# Patient Record
Sex: FEMALE | Race: WHITE | Employment: UNEMPLOYED | URBAN - METROPOLITAN AREA
[De-identification: names, ages, dates, MRNs, and addresses within clinical notes are randomized per-mention and may not be internally consistent; named-entity substitution may affect disease eponyms.]

---

## 2017-05-22 ENCOUNTER — OFFICE VISIT (OUTPATIENT)
Dept: DERMATOLOGY | Facility: CLINIC | Age: 22
End: 2017-05-22
Payer: COMMERCIAL

## 2017-05-22 VITALS — DIASTOLIC BLOOD PRESSURE: 77 MMHG | OXYGEN SATURATION: 99 % | SYSTOLIC BLOOD PRESSURE: 116 MMHG | HEART RATE: 68 BPM

## 2017-05-22 DIAGNOSIS — L73.8 SEBACEOUS GLAND HYPERPLASIA: Primary | ICD-10-CM

## 2017-05-22 PROCEDURE — 99203 OFFICE O/P NEW LOW 30 MIN: CPT | Performed by: PHYSICIAN ASSISTANT

## 2017-05-22 NOTE — NURSING NOTE
"Chief Complaint   Patient presents with     Derm Problem     spot to check       Initial /77  Pulse 68  SpO2 99% Estimated body mass index is 25.77 kg/(m^2) as calculated from the following:    Height as of 1/25/16: 1.588 m (5' 2.5\").    Weight as of 1/25/16: 65 kg (143 lb 3.2 oz).  BP completed using cuff size: kayden Moore LPN    "

## 2017-05-22 NOTE — MR AVS SNAPSHOT
"              After Visit Summary   2017    Kelsy Ramírez    MRN: [id number]           Patient Information     Date Of Birth          1995        Visit Information        Provider Department      2017 3:45 PM Tamra Ramires PA-C [facility]        Today's Diagnoses     Sebaceous gland hyperplasia    -  1       Follow-ups after your visit        Who to contact     If you have questions or need follow up information about today's clinic visit or your schedule please contact [facility] directly at [phone].  Normal or non-critical lab and imaging results will be communicated to you by HomeRunhart, letter or phone within 4 business days after the clinic has received the results. If you do not hear from us within 7 days, please contact the clinic through HomeRunhart or phone. If you have a critical or abnormal lab result, we will notify you by phone as soon as possible.  Submit refill requests through [facility] or call your pharmacy and they will forward the refill request to us. Please allow 3 business days for your refill to be completed.          Additional Information About Your Visit        MyChart Information     [facility] lets you send messages to your doctor, view your test results, renew your prescriptions, schedule appointments and more. To sign up, go to [email or web address] . Click on \"Log in\" on the left side of the screen, which will take you to the Welcome page. Then click on \"Sign up Now\" on the right side of the page.     You will be asked to enter the access code listed below, as well as some personal information. Please follow the directions to create your username and password.     Your access code is: [email or web address]  Expires: 2017  4:33 PM     Your access code will  in 90 days. If you need help or a new code, please call your [facility] or [phone].        Care EveryWhere ID     This is your Care EveryWhere ID. This could be used by " other organizations to access your [facility] medical records  [id number]        Your Vitals Were     Pulse Pulse Oximetry                68 99%           Blood Pressure from Last 3 Encounters:   05/22/17 116/77   01/25/16 119/78   10/30/14 114/68    Weight from Last 3 Encounters:   01/25/16 65 kg (143 lb 3.2 oz) (78 %)*   10/30/14 61.1 kg (134 lb 9.6 oz) (71 %)*   09/12/14 64.4 kg (142 lb) (80 %)*     * Growth percentiles are based on [facility] 2-20 Years data.              Today, you had the following     No orders found for display       Primary Care Provider Office Phone # Fax #    Mynor Temple [phone]       [address] [address]        Thank you!     Thank you for choosing [facility]  for your care. Our goal is always to provide you with excellent care. Hearing back from our patients is one way we can continue to improve our services. Please take a few minutes to complete the written survey that you may receive in the mail after your visit with us. Thank you!             Your Updated Medication List - Protect others around you: Learn how to safely use, store and throw away your medicines at www.disposemymeds.org.          This list is accurate as of: 5/22/17  4:33 PM.  Always use your most recent med list.                   Brand Name Dispense Instructions for use    CORDRAN 4 MCG/SQCM Tape   Generic drug:  flurandrenolide          IMPLANON SC

## 2017-05-22 NOTE — PROGRESS NOTES
HPI:   Kelsy Ramírez is a 21 year old female who presents for evaluation of a spot on the forehead  chief complaint  Location: mid forehead   Condition present for:  months.   Previous treatments include: none  -Recently stopped Accutane 1 month ago. Was being treated in [place] where she was going to school  -Going to a new school in [place], [place] next year. Studying bio.     Review Of Systems  Eyes: negative  Ears/Nose/Throat: negative  Respiratory: No shortness of breath, dyspnea on exertion, cough, or hemoptysis  Cardiovascular: negative  Gastrointestinal: negative  Genitourinary: negative  Musculoskeletal: negative  Neurologic: negative  Psychiatric: negative        PHYSICAL EXAM:      Skin exam performed as follows: Type 2 skin. Mood appropriate  Alert and Oriented X 3. Well developed, well nourished in no distress.  General appearance: Normal  Head including face: Normal  Eyes: conjunctiva and lids: Normal  Mouth: Lips, teeth, gums: Normal  Neck: Normal  Chest-breast/axillae: Normal  Back: Normal  Spleen and liver: Normal  Cardiovascular: Exam of peripheral vascular system by observation for swelling, varicosities, edema: Normal  Genitalia: groin, buttocks: Normal  Extremities: digits/nails (clubbing): Normal  Eccrine and Apocrine glands: Normal  Right upper extremity: Normal  Left upper extremity: Normal  Right lower extremity: Normal  Left lower extremity: Normal  Skin: Scalp and body hair: See below    1. Waxy yellow papule on mid forehead x 1    ASSESSMENT/PLAN:     1. Sebaceous hyperplasia located on mid forehead. Advised benign. Discussed cautery if desired; advised on approximate cost if not covered. She would like to proceed with cautery. Stopped Accutane 1 month ago. Recommend waiting a full 6 months after stopping Accutane to do any sort of procedure on the skin.         Follow-up: October/November for cautery  CC:   Scribed By: Tamra Ramires, MS, PAShiraC

## 2019-07-16 ENCOUNTER — TELEPHONE (OUTPATIENT)
Dept: DERMATOLOGY | Facility: CLINIC | Age: 24
End: 2019-07-16

## 2019-07-16 NOTE — TELEPHONE ENCOUNTER
"Tried to reach caller, but mailbox is full and cannot accept new messages.     RN cannot \"See\" a patient. Needs to be seen and examined/diagnosed by a Health care Provider.    No openings in Dermatology clinic until Mid September and > 130 patients currently on Dermatology clinic wait list.     Can try PCP, or [facility] [phone] or [facility] [phone].    Unable to work in those dates as Clinic is not open weekends and only Derm Provider is already full on Friday 7-19-19.     Lisette Juares RN        "

## 2019-07-16 NOTE — TELEPHONE ENCOUNTER
"Reason for Call:  Other     Detailed comments: Pt's mom, Belinda, calling (no consent to communicate on file):  Pt has a mole that she wants looked at. It is located on her back and states it looks \"weird\". Pt is coming to town on 07/19 and leaving on 07/21. AFR said she could possibly be seen by an RN - Please advise    Phone Number Patient can be reached at: Mom's cell:  [phone]    Pt's cell:  [phone]    Best Time: Any    Can we leave a detailed message on this number? YES    Call taken on 7/16/2019 at 3:35 PM by Denise Behrendt      "

## 2019-10-04 ENCOUNTER — TELEPHONE (OUTPATIENT)
Dept: DERMATOLOGY | Facility: CLINIC | Age: 24
End: 2019-10-04

## 2019-10-04 NOTE — TELEPHONE ENCOUNTER
Mom scheduled Dec 26 th appt for patient to have Sebaceous Hyperplasia cauterized by Provider.     Do you want to see patient for this? Or is she to see Dr. Rangel?...    If you see her, do you need extra time?.. (Appt was scheduled for 30 minutes).    Please advise. (No consent to communicate with Mom is in chart, so will need to ask Mom to ask patient to call us)     Lisette Juares RN

## 2019-10-05 NOTE — TELEPHONE ENCOUNTER
I can see her; no need for extra time, just a 15 min slot     Please offer her BLT cream as it hurts to cauterize. If she wants this for anesthesia, please have her arrive 20-30 min prior to appointment and have her let the front know she has arrived for numbing cream prior to her procedure so we know to get it on her.

## 2019-10-07 NOTE — TELEPHONE ENCOUNTER
Called patient and informed of note below. Pt verbalized understanding and will come early for BLT cream.   Marian SANTIAGO RN BSN PHN  Specialty Clinics

## 2019-12-26 ENCOUNTER — OFFICE VISIT (OUTPATIENT)
Dept: DERMATOLOGY | Facility: CLINIC | Age: 24
End: 2019-12-26
Payer: COMMERCIAL

## 2019-12-26 VITALS — HEART RATE: 80 BPM | DIASTOLIC BLOOD PRESSURE: 71 MMHG | SYSTOLIC BLOOD PRESSURE: 110 MMHG | OXYGEN SATURATION: 98 %

## 2019-12-26 DIAGNOSIS — L73.8 SEBACEOUS HYPERPLASIA: Primary | ICD-10-CM

## 2019-12-26 DIAGNOSIS — D23.9 DERMAL NEVUS: ICD-10-CM

## 2019-12-26 PROCEDURE — 96999 UNLISTED SPEC DERM SVC/PX: CPT | Performed by: PHYSICIAN ASSISTANT

## 2019-12-26 PROCEDURE — 99213 OFFICE O/P EST LOW 20 MIN: CPT | Performed by: PHYSICIAN ASSISTANT

## 2019-12-26 NOTE — LETTER
12/26/2019         RE: Kelsy Ramírez  [address] [address]        Dear Colleague,    Thank you for referring your patient, Kelsy Ramírez, to the [facility]. Please see a copy of my visit note below.    HPI:   CC: Kelsy Ramírez is a 21 year old female who presents for treatment of sebaceous gland hyperplasia on the forehead  Location: mid forehead   Condition present for:  months.   Previous treatments include: none  -Going to a new school in [place], [place] next year. Maybe will move to [place] after graduation    Review Of Systems  Eyes: negative  Ears/Nose/Throat: negative  Respiratory: No shortness of breath, dyspnea on exertion, cough, or hemoptysis  Cardiovascular: negative  Gastrointestinal: negative  Genitourinary: negative  Musculoskeletal: negative  Neurologic: negative  Psychiatric: negative        PHYSICAL EXAM:    /71 (BP Location: Left arm, Patient Position: Sitting, Cuff Size: Adult Regular)   Pulse 80   SpO2 98%   Skin exam performed as follows: Type 2 skin. Mood appropriate  Alert and Oriented X 3. Well developed, well nourished in no distress.  General appearance: Normal  Head including face: Normal  Eyes: conjunctiva and lids: Normal  Mouth: Lips, teeth, gums: Normal  Neck: Normal  Chest-breast/axillae: Normal  Back: Normal  Spleen and liver: Normal  Cardiovascular: Exam of peripheral vascular system by observation for swelling, varicosities, edema: Normal  Genitalia: groin, buttocks: Normal  Extremities: digits/nails (clubbing): Normal  Eccrine and Apocrine glands: Normal  Right upper extremity: Normal  Left upper extremity: Normal  Right lower extremity: Normal  Left lower extremity: Normal  Skin: Scalp and body hair: See below    1. Waxy yellow papule on mid forehead x 1  2. Brown fleshy papules on the back    ASSESSMENT/PLAN:     1. Sebaceous hyperplasia located on mid forehead. Cautery performed at a setting of 4.0. Pt  tolerated well with no complications.   2. . Dermal nevi on the back - benign in appearance no treatment needed        Follow-up: PRN  CC:   Scribed By: Tamra Ramires, MS, DAY      Again, thank you for allowing me to participate in the care of your patient.        Sincerely,        Tamra Ramires PA-C

## 2019-12-26 NOTE — PROGRESS NOTES
HPI:   CC: Kelsy Ramírez is a 21 year old female who presents for treatment of sebaceous gland hyperplasia on the forehead  Location: mid forehead   Condition present for:  months.   Previous treatments include: none  -Going to a new school in [place], [place] next year. Maybe will move to [place] after graduation    Review Of Systems  Eyes: negative  Ears/Nose/Throat: negative  Respiratory: No shortness of breath, dyspnea on exertion, cough, or hemoptysis  Cardiovascular: negative  Gastrointestinal: negative  Genitourinary: negative  Musculoskeletal: negative  Neurologic: negative  Psychiatric: negative        PHYSICAL EXAM:    /71 (BP Location: Left arm, Patient Position: Sitting, Cuff Size: Adult Regular)   Pulse 80   SpO2 98%   Skin exam performed as follows: Type 2 skin. Mood appropriate  Alert and Oriented X 3. Well developed, well nourished in no distress.  General appearance: Normal  Head including face: Normal  Eyes: conjunctiva and lids: Normal  Mouth: Lips, teeth, gums: Normal  Neck: Normal  Chest-breast/axillae: Normal  Back: Normal  Spleen and liver: Normal  Cardiovascular: Exam of peripheral vascular system by observation for swelling, varicosities, edema: Normal  Genitalia: groin, buttocks: Normal  Extremities: digits/nails (clubbing): Normal  Eccrine and Apocrine glands: Normal  Right upper extremity: Normal  Left upper extremity: Normal  Right lower extremity: Normal  Left lower extremity: Normal  Skin: Scalp and body hair: See below    1. Waxy yellow papule on mid forehead x 1  2. Brown fleshy papules on the back    ASSESSMENT/PLAN:     1. Sebaceous hyperplasia located on mid forehead. Cautery performed at a setting of 4.0. Pt tolerated well with no complications.   2. . Dermal nevi on the back - benign in appearance no treatment needed        Follow-up: PRN  CC:   Scribed By: Tamra Ramires, MS, PA-C

## 2019-12-26 NOTE — NURSING NOTE
"Initial /71 (BP Location: Left arm, Patient Position: Sitting, Cuff Size: Adult Regular)   Pulse 80   SpO2 98%  Estimated body mass index is 25.77 kg/m  as calculated from the following:    Height as of 1/25/16: 1.588 m (5' 2.5\").    Weight as of 1/25/16: 65 kg (143 lb 3.2 oz). .      "

## 2023-01-19 ENCOUNTER — TRANSCRIBE ORDERS (OUTPATIENT)
Dept: OTHER | Age: 28
End: 2023-01-19

## 2023-01-19 DIAGNOSIS — M25.511 RIGHT SHOULDER PAIN: Primary | ICD-10-CM

## 2023-02-09 ENCOUNTER — APPOINTMENT (OUTPATIENT)
Dept: GENERAL RADIOLOGY | Facility: CLINIC | Age: 28
End: 2023-02-09
Attending: NURSE PRACTITIONER
Payer: COMMERCIAL

## 2023-02-09 ENCOUNTER — HOSPITAL ENCOUNTER (EMERGENCY)
Facility: CLINIC | Age: 28
Discharge: HOME | End: 2023-02-09
Attending: NURSE PRACTITIONER | Admitting: NURSE PRACTITIONER
Payer: COMMERCIAL

## 2023-02-09 VITALS
TEMPERATURE: 99 F | HEART RATE: 115 BPM | HEIGHT: 62 IN | BODY MASS INDEX: 26.68 KG/M2 | DIASTOLIC BLOOD PRESSURE: 94 MMHG | WEIGHT: 145 LBS | OXYGEN SATURATION: 100 % | SYSTOLIC BLOOD PRESSURE: 159 MMHG | RESPIRATION RATE: 18 BRPM

## 2023-02-09 DIAGNOSIS — S60.112A CONTUSION OF LEFT THUMB WITH DAMAGE TO NAIL, INITIAL ENCOUNTER: ICD-10-CM

## 2023-02-09 DIAGNOSIS — S60.10XA SUBUNGUAL HEMATOMA OF FINGER OF LEFT HAND, INITIAL ENCOUNTER: ICD-10-CM

## 2023-02-09 PROCEDURE — 250N000013 HC RX MED GY IP 250 OP 250 PS 637: Performed by: NURSE PRACTITIONER

## 2023-02-09 PROCEDURE — 73140 X-RAY EXAM OF FINGER(S): CPT | Mod: LT

## 2023-02-09 PROCEDURE — 11740 EVACUATION SUBUNGUAL HMTMA: CPT | Mod: FA | Performed by: NURSE PRACTITIONER

## 2023-02-09 PROCEDURE — 99213 OFFICE O/P EST LOW 20 MIN: CPT | Mod: 25 | Performed by: NURSE PRACTITIONER

## 2023-02-09 PROCEDURE — G0463 HOSPITAL OUTPT CLINIC VISIT: HCPCS | Mod: 25 | Performed by: NURSE PRACTITIONER

## 2023-02-09 RX ORDER — ACETAMINOPHEN 500 MG
1000 TABLET ORAL ONCE
Status: COMPLETED | OUTPATIENT
Start: 2023-02-09 | End: 2023-02-09

## 2023-02-09 RX ADMIN — ACETAMINOPHEN 1000 MG: 500 TABLET, FILM COATED ORAL at 19:56

## 2023-02-10 NOTE — ED PROVIDER NOTES
"  History     Chief Complaint   Patient presents with     Thumb Discomfort     Left thumb injury while at work.     ERIN Ramírez is a 27 year old female who presents to urgent care with an acute left thumb injury.  Patient reports the injury occurred around 1:00 this afternoon.  Patient reports a log fell smashing her left thumb.  She endorses bruising, swelling, decreased motion of the thumb.  Patient reports the tip is numb.  Patient has not tried any therapies or treatments for this.  Past medical history remarkable for daily vaping.  Patient denies any fever, aches, chills, sweats, ear pain, eye pain, throat pain, chest pain, cough, shortness of breath, difficulty breathing, nausea, vomiting, abdominal pain, constipation, diarrhea, dysuria, mental confusion, left or right-sided body weakness or thoughts of harming self.    Allergies:  Allergies   Allergen Reactions     Nickel Rash       Problem List:    There are no problems to display for this patient.       Past Medical History:    History reviewed. No pertinent past medical history.    Past Surgical History:    History reviewed. No pertinent surgical history.    Family History:    Family History   Problem Relation Age of Onset     Melanoma No family hx of        Social History:  Marital Status:  Single [1]  Social History     Tobacco Use     Smoking status: Never     Smokeless tobacco: Never   Substance Use Topics     Alcohol use: No     Drug use: No        Medications:    Etonogestrel (IMPLANON SC)  Flurandrenolide (CORDRAN) 4 MCG/SQCM TAPE          Review of Systems  As mentioned above in the history present illness. All other systems were reviewed and are negative.    Physical Exam   BP: (!) 159/94  Pulse: 115  Temp: 99  F (37.2  C)  Resp: 18  Height: 157.5 cm (5' 2\")  Weight: 65.8 kg (145 lb)  SpO2: 100 %      Physical Exam  Vitals and nursing note reviewed.   Constitutional:       General: She is not in acute distress.     Appearance: She is " well-developed. She is not diaphoretic.   HENT:      Head: Normocephalic and atraumatic.      Right Ear: External ear normal.      Left Ear: External ear normal.   Eyes:      General:         Right eye: No discharge.         Left eye: No discharge.      Conjunctiva/sclera: Conjunctivae normal.   Cardiovascular:      Rate and Rhythm: Regular rhythm.      Heart sounds: Normal heart sounds. No murmur heard.    No friction rub.   Pulmonary:      Effort: Pulmonary effort is normal. No respiratory distress.      Breath sounds: Normal breath sounds. No stridor. No wheezing or rales.   Chest:      Chest wall: No tenderness.   Musculoskeletal:      Left hand: Swelling (of left thumb mid to distal thumb -subungual hematoma encompassing 60% of her left fingernail.  Ecchymosis noted to distal phalanx of the left thumb.), tenderness (left thumb) and bony tenderness (DIP joint to distal tip of thumb) present. No deformity or lacerations. Decreased range of motion (left thumb). Decreased strength of finger abduction.      Comments: Trephination of the left thumbnail completed and copious amounts of bloody drainage noted.  Patient reports pain level went from a 10 down to a 4.  Wound cleansed and bacitracin and Band-Aid applied.  Range of motion normal.   Skin:     General: Skin is warm and dry.      Coloration: Skin is not pale.      Findings: No erythema or rash.   Neurological:      Mental Status: She is alert.         ED Course                 Procedures    Results for orders placed or performed during the hospital encounter of 02/09/23 (from the past 24 hour(s))   Fingers XR, 2-3 views, left    Narrative    EXAM: XR THUMB LEFT G/E 2 VIEWS  LOCATION: [facility]  DATE/TIME: 2/9/2023 7:47 PM    INDICATION: Thumb injury, pain  COMPARISON: None.      Impression    IMPRESSION: Normal joint spaces and alignment. No fracture.         Medications   acetaminophen (TYLENOL) tablet 1,000 mg (1,000 mg Oral Given  2/9/23 1956)       Assessments & Plan (with Medical Decision Making)     I have reviewed the nursing notes.    I have reviewed the findings, diagnosis, plan and need for follow up with the patient.    27-year-old female presenting to urgent care with blunt force trauma of left thumb.  Patient had a piece of log fall landing on her left thumb.  Patient has subungual hematoma encompassing 60% of her left fingernail with surrounding ecchymosis on the distal phalanx of her thumb.  Patient has swelling noted as well.  X-ray ordered to evaluate for possible fracture.  No evidence of fracture.  Trephination of the nail resulted in copious amounts of bloody drainage and patient reports pain level went from a 9 down to a 4.  Area was covered with bacitracin and a Band-Aid.  Tetanus is updated in 2022 and not indicated today.  Discussed with patient care of the finger discussed risk of possible nail falling off.  Patient discharged in stable condition.        New Prescriptions    No medications on file       Final diagnoses:   Contusion of left thumb with damage to nail, initial encounter   Subungual hematoma of finger of left hand, initial encounter       2/9/2023   [facility] EMERGENCY DEPT     Selene Solano APRN CNP  02/09/23 2007

## 2023-02-10 NOTE — DISCHARGE INSTRUCTIONS
Use bandaid to cover nail while working then leave open to air when not working  Apply bacitracin or aquaphor to nail to help prevent infection  Tetanus was updated in 2022 and not indicated today.  Follow-up if there are concerns of infection.  The bruising should resolve over the next 2 weeks.  You may take Tylenol or ibuprofen as needed for pain.  The x-ray today does not show any infection if there is worsening finger pain you should be reevaluated

## 2023-04-09 ENCOUNTER — HEALTH MAINTENANCE LETTER (OUTPATIENT)
Age: 28
End: 2023-04-09

## 2023-04-11 ENCOUNTER — LAB REQUISITION (OUTPATIENT)
Dept: LAB | Facility: CLINIC | Age: 28
End: 2023-04-11

## 2023-04-11 LAB
PATH REPORT.COMMENTS IMP SPEC: NORMAL
PATH REPORT.FINAL DX SPEC: NORMAL
PATH REPORT.GROSS SPEC: NORMAL
PATH REPORT.MICROSCOPIC SPEC OTHER STN: NORMAL
PATH REPORT.RELEVANT HX SPEC: NORMAL
PATH REPORT.RELEVANT HX SPEC: NORMAL
PATH REPORT.SITE OF ORIGIN SPEC: NORMAL

## 2024-06-16 ENCOUNTER — HEALTH MAINTENANCE LETTER (OUTPATIENT)
Age: 29
End: 2024-06-16

## 2024-12-16 ENCOUNTER — APPOINTMENT (OUTPATIENT)
Dept: CT IMAGING | Facility: CLINIC | Age: 29
End: 2024-12-16
Attending: EMERGENCY MEDICINE
Payer: COMMERCIAL

## 2024-12-16 ENCOUNTER — HOSPITAL ENCOUNTER (EMERGENCY)
Facility: CLINIC | Age: 29
Discharge: HOME | End: 2024-12-16
Attending: EMERGENCY MEDICINE | Admitting: EMERGENCY MEDICINE
Payer: COMMERCIAL

## 2024-12-16 VITALS
HEART RATE: 66 BPM | BODY MASS INDEX: 24.84 KG/M2 | SYSTOLIC BLOOD PRESSURE: 117 MMHG | RESPIRATION RATE: 16 BRPM | TEMPERATURE: 97.6 F | HEIGHT: 62 IN | OXYGEN SATURATION: 93 % | DIASTOLIC BLOOD PRESSURE: 91 MMHG | WEIGHT: 135 LBS
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
## 2024-12-16 DIAGNOSIS — R10.9 LEFT SIDED ABDOMINAL PAIN: ICD-10-CM

## 2024-12-16 DIAGNOSIS — S09.90XA CLOSED HEAD INJURY, INITIAL ENCOUNTER: ICD-10-CM

## 2024-12-16 DIAGNOSIS — R41.0 CONFUSION: ICD-10-CM

## 2024-12-16 DIAGNOSIS — S22.42XA CLOSED FRACTURE OF MULTIPLE RIBS OF LEFT SIDE, INITIAL ENCOUNTER: ICD-10-CM

## 2024-12-16 LAB
HOLD SPECIMEN: NORMAL
HOLD SPECIMEN: NORMAL

## 2024-12-16 PROCEDURE — 250N000011 HC RX IP 250 OP 636: Performed by: EMERGENCY MEDICINE

## 2024-12-16 PROCEDURE — 250N000009 HC RX 250: Performed by: EMERGENCY MEDICINE

## 2024-12-16 PROCEDURE — 72125 CT NECK SPINE W/O DYE: CPT

## 2024-12-16 PROCEDURE — 99285 EMERGENCY DEPT VISIT HI MDM: CPT | Mod: 25

## 2024-12-16 PROCEDURE — 96374 THER/PROPH/DIAG INJ IV PUSH: CPT | Mod: 59

## 2024-12-16 PROCEDURE — 99285 EMERGENCY DEPT VISIT HI MDM: CPT | Performed by: EMERGENCY MEDICINE

## 2024-12-16 PROCEDURE — 70450 CT HEAD/BRAIN W/O DYE: CPT

## 2024-12-16 PROCEDURE — 96375 TX/PRO/DX INJ NEW DRUG ADDON: CPT

## 2024-12-16 PROCEDURE — 74177 CT ABD & PELVIS W/CONTRAST: CPT

## 2024-12-16 RX ORDER — OXYCODONE HYDROCHLORIDE 5 MG/1
5 TABLET ORAL EVERY 6 HOURS PRN
Qty: 10 TABLET | Refills: 0 | Status: SHIPPED | OUTPATIENT
Start: 2024-12-16

## 2024-12-16 RX ORDER — KETOROLAC TROMETHAMINE 15 MG/ML
15 INJECTION, SOLUTION INTRAMUSCULAR; INTRAVENOUS ONCE
Status: COMPLETED | OUTPATIENT
Start: 2024-12-16 | End: 2024-12-16

## 2024-12-16 RX ORDER — IOPAMIDOL 755 MG/ML
66 INJECTION, SOLUTION INTRAVASCULAR ONCE
Status: COMPLETED | OUTPATIENT
Start: 2024-12-16 | End: 2024-12-16

## 2024-12-16 RX ORDER — MORPHINE SULFATE 4 MG/ML
4 INJECTION, SOLUTION INTRAMUSCULAR; INTRAVENOUS
Status: DISCONTINUED | OUTPATIENT
Start: 2024-12-16 | End: 2024-12-16 | Stop reason: HOSPADM

## 2024-12-16 RX ORDER — ONDANSETRON 2 MG/ML
4 INJECTION INTRAMUSCULAR; INTRAVENOUS ONCE
Status: COMPLETED | OUTPATIENT
Start: 2024-12-16 | End: 2024-12-16

## 2024-12-16 RX ADMIN — SODIUM CHLORIDE 56 ML: 9 INJECTION, SOLUTION INTRAVENOUS at 08:43

## 2024-12-16 RX ADMIN — IOPAMIDOL 66 ML: 755 INJECTION, SOLUTION INTRAVENOUS at 08:43

## 2024-12-16 RX ADMIN — KETOROLAC TROMETHAMINE 15 MG: 15 INJECTION, SOLUTION INTRAMUSCULAR; INTRAVENOUS at 08:24

## 2024-12-16 RX ADMIN — ONDANSETRON 4 MG: 2 INJECTION, SOLUTION INTRAMUSCULAR; INTRAVENOUS at 09:40

## 2024-12-16 ASSESSMENT — ACTIVITIES OF DAILY LIVING (ADL)
ADLS_ACUITY_SCORE: 41

## 2024-12-16 ASSESSMENT — COLUMBIA-SUICIDE SEVERITY RATING SCALE - C-SSRS
2. HAVE YOU ACTUALLY HAD ANY THOUGHTS OF KILLING YOURSELF IN THE PAST MONTH?: NO
6. HAVE YOU EVER DONE ANYTHING, STARTED TO DO ANYTHING, OR PREPARED TO DO ANYTHING TO END YOUR LIFE?: NO
1. IN THE PAST MONTH, HAVE YOU WISHED YOU WERE DEAD OR WISHED YOU COULD GO TO SLEEP AND NOT WAKE UP?: NO
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
## 2024-12-16 NOTE — Clinical Note
Kelsy Wong was seen and treated in our emergency department on 12/16/2024.  She may return to work on 12/19/2024.  No lifting greater than 5 pounds with left arm for next 10 days, or until follow up with primary doctor.     If you have any questions or concerns, please don't hesitate to call.      Juwan Hall MD
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
